# Patient Record
Sex: FEMALE | Race: WHITE | NOT HISPANIC OR LATINO | Employment: STUDENT | URBAN - METROPOLITAN AREA
[De-identification: names, ages, dates, MRNs, and addresses within clinical notes are randomized per-mention and may not be internally consistent; named-entity substitution may affect disease eponyms.]

---
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
## 2024-09-21 ENCOUNTER — PATIENT MESSAGE (OUTPATIENT)
Dept: OBSTETRICS AND GYNECOLOGY | Facility: CLINIC | Age: 19
End: 2024-09-21
Payer: COMMERCIAL

## 2024-09-25 RX ORDER — NORGESTIMATE AND ETHINYL ESTRADIOL 0.25-0.035
1 KIT ORAL DAILY
Qty: 84 TABLET | Refills: 3 | Status: SHIPPED | OUTPATIENT
Start: 2024-09-25 | End: 2025-09-25

## 2024-09-25 NOTE — TELEPHONE ENCOUNTER
Insurance doesn't cover chewable OCPs.  Initially asked about Depo because she has trouble swallowing pills.  I told her OCPs are tiny if she wanted to try a pill first.  She would like to try an OCP as she has never tried to take one.      Allergies and pharmacy UTD

## 2025-03-25 NOTE — PROGRESS NOTES
[facility] Progress Note      Chief Complaint     Annual Exam         Subjective:      History of Present Illness:  Jessie Araujo is a 19 y.o. female new to me here to establish care.    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 Cohn 2022; on OCP since 2024   1st Boyfriend x 7 months via friend of friend; attends [employer]; [place] went to Brother Eliud; 19 y/o   Never sexually active; no experimentation with any drugs or tobacco; drinks white claw, high noon, frozen hot chocolate daquiri at her home and never with her friends or at bar  PHQ2: negative; overall happy at home and appreciates her step-mother and her family      Past Medical History:  Past Medical History:   Diagnosis Date    ADHD (attention deficit hyperactivity disorder)     Scoliosis     Test anxiety 2020       Past Surgical History:  Past Surgical History:   Procedure Laterality Date    TYMPANOSTOMY TUBE PLACEMENT         Allergies:  Review of patient's allergies indicates:  No Known Allergies    Home Medications:  Current Outpatient Medications    Medication Sig Dispense Refill    cetirizine 10 mg chewable tablet Take 10 mg by mouth once daily. 30 tablet 5    fluticasone propionate (FLONASE) 50 mcg/actuation nasal spray 1 spray by Each Nostril route as needed.      loratadine (CLARITIN) 10 mg tablet Take by mouth as needed.      multivitamin capsule Take 1 capsule by mouth once daily.      norgestimate-ethinyl estradioL (ORTHO-CYCLEN) 0.25-35 mg-mcg per tablet Take 1 tablet by mouth once daily. 84 tablet 3     No current facility-administered medications for this visit.        Family History:  Family History   Problem Relation Name Age of Onset    Colon cancer Paternal Grandfather      Other Paternal Grandmother      Daniel's disease Maternal Grandmother      Other Maternal Grandmother          Daniel's chorea    Hypertension Father      Crowder's disease Mother      Scoliosis Paternal Aunt      Hypertension Paternal Aunt      Hypertension Paternal Uncle      Birth defects Neg Hx      Asthma Neg Hx      Diabetes Neg Hx      Breast cancer Neg Hx         Social History:  Social History     Tobacco Use    Smoking status: Never    Smokeless tobacco: Never   Substance Use Topics    Alcohol use: Never    Drug use: Never       Review of Systems:  Pertinent positives and negatives listed in HPI. All other systems are reviewed and are negative.    Health Maintaince :   Health Maintenance Topics with due status: Not Due       Topic Last Completion Date    TETANUS VACCINE 02/23/2016    DTaP/Tdap/Td Vaccines 02/23/2016    RSV Vaccine (Age 60+ and Pregnant patients) Not Due           Eye: UTD wears glasses   Dental: UTD; brushes and flosses bid; invisalign appt today; will get retainer     Immunizations:   Tdap: 2/2016.  Influenza:  flu shot today.  COVID: x  3 - today   Hepatitis C:   Cancer Screening:    The ASCVD Risk score (Marco PUCKETT, et al., 2019) failed to calculate for the following reasons:    The 2019 ASCVD risk score is only valid for ages 40 to 79  "     Objective:   /70 (BP Location: Left arm, Patient Position: Sitting)   Pulse 90   Ht 5' 5" (1.651 m)   Wt 45 kg (99 lb 3.3 oz)   LMP 03/23/2025 (Exact Date)   SpO2 97%   BMI 16.51 kg/m²      Body mass index is 16.51 kg/m².       Physical Examination:  General: Alert and awake in no apparent distress  HEENT: Normocephalic and atraumatic; Tms WNL; mild nasal congestion  Eyes:  PERRL; EOMi with anicteric sclera and clear conjunctivae  Mouth:  Oropharynx clear and without exudate; moist mucous membranes  Neck:   Cervical nodes not enlarged; supple; no bruits  Cardio:  Regular rate and rhythm with normal S1 and S2; no murmurs or rubs  Resp:  CTAB; respirations unlabored; no wheezes, crackles or rhonchi  Abdom: Soft, NTND with normoactive bowel sounds; negative HSM  Extrem: Warm and well-perfused with no clubbing, cyanosis or edema  Skin:  No rashes, lesions, or color changes  Pulses:  2+ and symmetric distally  Neuro:  AAOx3; cooperative and pleasant with no focal deficits  Bimal Stage IV breasts and genitalia    Laboratory:      Most Recent Data:  Lab Results   Component Value Date    WBC 8.95 11/25/2022    HGB 13.0 11/25/2022    HCT 41.9 11/25/2022     11/25/2022    CHOL 154 11/25/2022    TRIG 49 11/25/2022    HDL 50 11/25/2022    ALT 13 11/25/2022    AST 18 11/25/2022     11/25/2022    K 4.0 11/25/2022     11/25/2022    BUN 10 11/25/2022    CO2 23 11/25/2022              CBC:   WBC   Date Value Ref Range Status   11/25/2022 8.95 3.90 - 12.70 K/uL Final     Hemoglobin   Date Value Ref Range Status   11/25/2022 13.0 12.0 - 16.0 g/dL Final     Hematocrit   Date Value Ref Range Status   11/25/2022 41.9 37.0 - 48.5 % Final     Platelets   Date Value Ref Range Status   11/25/2022 230 150 - 450 K/uL Final     MCV   Date Value Ref Range Status   11/25/2022 90 82 - 98 fL Final     RDW   Date Value Ref Range Status   11/25/2022 13.7 11.5 - 14.5 % Final     BMP:   Sodium   Date Value Ref Range " "Status   11/25/2022 136 136 - 145 mmol/L Final     Potassium   Date Value Ref Range Status   11/25/2022 4.0 3.5 - 5.1 mmol/L Final     Chloride   Date Value Ref Range Status   11/25/2022 105 95 - 110 mmol/L Final     CO2   Date Value Ref Range Status   11/25/2022 23 23 - 29 mmol/L Final     BUN   Date Value Ref Range Status   11/25/2022 10 6 - 20 mg/dL Final     Creatinine   Date Value Ref Range Status   11/25/2022 0.8 0.5 - 1.4 mg/dL Final     Glucose   Date Value Ref Range Status   11/25/2022 81 70 - 110 mg/dL Final     Calcium   Date Value Ref Range Status   11/25/2022 9.1 8.7 - 10.5 mg/dL Final     LFTs:   Total Protein   Date Value Ref Range Status   11/25/2022 7.1 6.0 - 8.4 g/dL Final     Albumin   Date Value Ref Range Status   11/25/2022 4.0 3.2 - 4.7 g/dL Final     Total Bilirubin   Date Value Ref Range Status   11/25/2022 0.3 0.1 - 1.0 mg/dL Final     Comment:     For infants and newborns, interpretation of results should be based  on gestational age, weight and in agreement with clinical  observations.    Premature Infant recommended reference ranges:  Up to 24 hours.............<8.0 mg/dL  Up to 48 hours............<12.0 mg/dL  3-5 days..................<15.0 mg/dL  6-29 days.................<15.0 mg/dL       AST   Date Value Ref Range Status   11/25/2022 18 10 - 40 U/L Final     Alkaline Phosphatase   Date Value Ref Range Status   11/25/2022 106 (H) 48 - 95 U/L Final     ALT   Date Value Ref Range Status   11/25/2022 13 10 - 44 U/L Final     Coags: No results found for: "INR", "PROTIME", "PTT"  FLP:      Lab Results   Component Value Date    CHOL 154 11/25/2022    CHOL 158 02/23/2016     Lab Results   Component Value Date    HDL 50 11/25/2022     Lab Results   Component Value Date    LDLCALC 94.2 11/25/2022     Lab Results   Component Value Date    TRIG 49 11/25/2022     Lab Results   Component Value Date    CHOLHDL 32.5 11/25/2022      DM:      LDL Cholesterol   Date Value Ref Range Status   11/25/2022 " "94.2 63.0 - 159.0 mg/dL Final     Comment:     The National Cholesterol Education Program (NCEP) has set the  following guidelines (reference values) for LDL Cholesterol:  Optimal.......................<130 mg/dL  Borderline High...............130-159 mg/dL  High..........................160-189 mg/dL  Very High.....................>190 mg/dL       Creatinine   Date Value Ref Range Status   11/25/2022 0.8 0.5 - 1.4 mg/dL Final     Thyroid: No results found for: "TSH", "FREET4", "C1KACTC", "C1QMMPO", "THYROIDAB"  Anemia: No results found for: "IRON", "TIBC", "FERRITIN", "MXCPYRBL10", "FOLATE"  Cardiac: No results found for: "TROPONINI", "CKTOTAL", "CKMB", "BNP"  Urinalysis:   Color, UA   Date Value Ref Range Status   11/25/2022 Yellow Yellow, Straw, Li Final     Specific Gravity, UA   Date Value Ref Range Status   11/25/2022 1.030 1.005 - 1.030 Final     Nitrite, UA   Date Value Ref Range Status   11/25/2022 Negative Negative Final     Ketones, UA   Date Value Ref Range Status   11/25/2022 Negative Negative Final     Urobilinogen, UA   Date Value Ref Range Status   02/23/2016 normal  Final       Other Laboratory Data:      Other Results:  EKG (my interpretation):     Radiology:  X-Ray Scoliosis Complete  Narrative: EXAMINATION:  XR SCOLIOSIS COMPLETE    CLINICAL HISTORY:  Adolescent idiopathic scoliosis, thoracolumbar region    TECHNIQUE:  AP and lateral scoliosis images    COMPARISON:  July 7, 2021    FINDINGS:  No acute fractures or segmentation abnormalities.  Reconfirmed thoracolumbar scoliosis, convex to the right in the mid thoracic region and to the left in the lower thoracic and lumbar region, position and degrees of curvature to be further commented on by ordering Department.  Reversal of normal cervical lordosis.  Impression: As above.    Electronically signed by: Aj Khan  Date:    07/06/2022  Time:    09:45            Assessment/Plan     Jessie Araujo is a 19 y.o. female with:  1. Healthy " adolescent on routine physical examination  Assessment & Plan:  -start exercise 30 min 5 times per week   -counseling provided regarding protected sex, diet, exercise, drug use  -flu shot today and get COVID booster    Counseling: nutrition (5 servings of fruit/veggies, minimal junk food, drink 6-8 glasses of water daily;  safety (wear helmet while riding bike); avoid smoking, alcohol, drugs; puberty; sleep 8-10 hours per night   peer interaction, sexual education, exercise 1 hour daily; limit computer/cell phone to 2 hours per day during school but can be more lax during summer but must do chores, eat meal with family; read for 1 hour daily    Follow Healthy Lifestyles 9-5-2-1-0 Rule:   Sleep at least 9 hours per night  Eat 5 servings of fruit and veggies per day; limit 2 hours of screen time or less per day; no TV in child's bedroom; 1 or more hours of physical activity per day; 0 sugar sweetened beverages per day  - eat fruit not drink it; avoid sports drinks, sodas.    Need to brush teeth twice per day with fluoride toothpaste and floss - go to dentist twice per year    Wear sunscreen at least 30 spf daily   Schedule eye exam annually  Counseled about importance of abstinence and use of condoms at all times.     Encourage sleep 9-12 hours for 6-13 y/o; 8-10 yrs for 13-18 years         Orders:  -     Lipid Panel; Future; Expected date: 03/26/2025  -     Hemoglobin A1C; Future; Expected date: 03/26/2025  -     Urinalysis; Future; Expected date: 03/26/2025  -     Comprehensive Metabolic Panel; Future; Expected date: 03/26/2025  -     CBC Auto Differential; Future; Expected date: 03/26/2025  -     TSH; Future; Expected date: 03/26/2025    2. Seasonal allergic rhinitis due to pollen  Assessment & Plan:  -simply saline then flonase 1 sq bid  -claritin ODT 10mg or Zyrtec 10mg po qhs       3. Underweight (BMI < 18.5)  Assessment & Plan:  Start MVI daily                  I spent a total of 55 minutes on the day of the  visit.This includes face to face time and non-face to face time preparing to see the patient (eg, review of tests), obtaining and/or reviewing separately obtained history, documenting clinical information in the electronic or other health record, independently interpreting results and communicating results to the patient/family/caregiver, or care coordinator.   Code Status:     Chanel Hoover MD

## 2025-03-26 ENCOUNTER — OFFICE VISIT (OUTPATIENT)
Dept: PRIMARY CARE CLINIC | Facility: CLINIC | Age: 20
End: 2025-03-26
Payer: COMMERCIAL

## 2025-03-26 ENCOUNTER — LAB VISIT (OUTPATIENT)
Dept: LAB | Facility: HOSPITAL | Age: 20
End: 2025-03-26
Attending: INTERNAL MEDICINE
Payer: COMMERCIAL

## 2025-03-26 ENCOUNTER — RESULTS FOLLOW-UP (OUTPATIENT)
Dept: PRIMARY CARE CLINIC | Facility: CLINIC | Age: 20
End: 2025-03-26

## 2025-03-26 VITALS
SYSTOLIC BLOOD PRESSURE: 106 MMHG | BODY MASS INDEX: 16.53 KG/M2 | HEART RATE: 90 BPM | WEIGHT: 99.19 LBS | OXYGEN SATURATION: 97 % | HEIGHT: 65 IN | DIASTOLIC BLOOD PRESSURE: 70 MMHG

## 2025-03-26 DIAGNOSIS — Z00.3 HEALTHY ADOLESCENT ON ROUTINE PHYSICAL EXAMINATION: ICD-10-CM

## 2025-03-26 DIAGNOSIS — J30.1 SEASONAL ALLERGIC RHINITIS DUE TO POLLEN: ICD-10-CM

## 2025-03-26 DIAGNOSIS — Z00.3 HEALTHY ADOLESCENT ON ROUTINE PHYSICAL EXAMINATION: Primary | ICD-10-CM

## 2025-03-26 DIAGNOSIS — R63.6 UNDERWEIGHT (BMI < 18.5): ICD-10-CM

## 2025-03-26 DIAGNOSIS — Z23 NEED FOR VACCINATION: ICD-10-CM

## 2025-03-26 PROBLEM — F41.8 TEST ANXIETY: Status: RESOLVED | Noted: 2020-02-01 | Resolved: 2025-03-26

## 2025-03-26 LAB
ABSOLUTE EOSINOPHIL (OHS): 0.13 K/UL
ABSOLUTE MONOCYTE (OHS): 0.71 K/UL (ref 0.3–1)
ABSOLUTE NEUTROPHIL COUNT (OHS): 2.49 K/UL (ref 1.8–7.7)
ALBUMIN SERPL BCP-MCNC: 3.7 G/DL (ref 3.5–5.2)
ALP SERPL-CCNC: 57 UNIT/L (ref 40–150)
ALT SERPL W/O P-5'-P-CCNC: 24 UNIT/L (ref 10–44)
AMORPH CRY UR QL COMP ASSIST: NORMAL
ANION GAP (OHS): 14 MMOL/L (ref 8–16)
AST SERPL-CCNC: 35 UNIT/L (ref 11–45)
BACTERIA #/AREA URNS AUTO: NORMAL /HPF
BASOPHILS # BLD AUTO: 0.01 K/UL
BASOPHILS NFR BLD AUTO: 0.2 %
BILIRUB SERPL-MCNC: 0.3 MG/DL (ref 0.1–1)
BILIRUB UR QL STRIP.AUTO: NEGATIVE
BUN SERPL-MCNC: 13 MG/DL (ref 6–20)
CALCIUM SERPL-MCNC: 9.2 MG/DL (ref 8.7–10.5)
CHLORIDE SERPL-SCNC: 103 MMOL/L (ref 95–110)
CHOLEST SERPL-MCNC: 151 MG/DL (ref 120–199)
CHOLEST/HDLC SERPL: 4.9 {RATIO} (ref 2–5)
CLARITY UR: ABNORMAL
CO2 SERPL-SCNC: 21 MMOL/L (ref 23–29)
COLOR UR AUTO: ABNORMAL
CREAT SERPL-MCNC: 0.9 MG/DL (ref 0.5–1.4)
EAG (OHS): 100 MG/DL (ref 68–131)
ERYTHROCYTE [DISTWIDTH] IN BLOOD BY AUTOMATED COUNT: 13.1 % (ref 11.5–14.5)
GFR SERPLBLD CREATININE-BSD FMLA CKD-EPI: >60 ML/MIN/1.73/M2
GLUCOSE SERPL-MCNC: 81 MG/DL (ref 70–110)
GLUCOSE UR QL STRIP: NEGATIVE
HBA1C MFR BLD: 5.1 % (ref 4–5.6)
HCT VFR BLD AUTO: 45.4 % (ref 37–48.5)
HDLC SERPL-MCNC: 31 MG/DL (ref 40–75)
HDLC SERPL: 20.5 % (ref 20–50)
HGB BLD-MCNC: 14.1 GM/DL (ref 12–16)
HGB UR QL STRIP: NEGATIVE
HYALINE CASTS UR QL AUTO: 0 /LPF (ref 0–1)
IMM GRANULOCYTES # BLD AUTO: 0.01 K/UL (ref 0–0.04)
IMM GRANULOCYTES NFR BLD AUTO: 0.2 % (ref 0–0.5)
KETONES UR QL STRIP: NEGATIVE
LDLC SERPL CALC-MCNC: 94.2 MG/DL (ref 63–159)
LEUKOCYTE ESTERASE UR QL STRIP: NEGATIVE
LYMPHOCYTES # BLD AUTO: 1.4 K/UL (ref 1–4.8)
MCH RBC QN AUTO: 27.8 PG (ref 27–50)
MCHC RBC AUTO-ENTMCNC: 31.1 G/DL (ref 32–36)
MCV RBC AUTO: 89 FL (ref 82–98)
MICROSCOPIC COMMENT: NORMAL
NITRITE UR QL STRIP: NEGATIVE
NONHDLC SERPL-MCNC: 120 MG/DL
NUCLEATED RBC (/100WBC) (OHS): 0 /100 WBC
PH UR STRIP: 6 [PH]
PLATELET # BLD AUTO: 185 K/UL (ref 150–450)
PMV BLD AUTO: 11.3 FL (ref 9.2–12.9)
POTASSIUM SERPL-SCNC: 3.8 MMOL/L (ref 3.5–5.1)
PROT SERPL-MCNC: 7.5 GM/DL (ref 6–8.4)
PROT UR QL STRIP: ABNORMAL
RBC # BLD AUTO: 5.08 M/UL (ref 4–5.4)
RBC #/AREA URNS AUTO: 0 /HPF (ref 0–4)
RELATIVE EOSINOPHIL (OHS): 2.7 %
RELATIVE LYMPHOCYTE (OHS): 29.5 % (ref 18–48)
RELATIVE MONOCYTE (OHS): 14.9 % (ref 4–15)
RELATIVE NEUTROPHIL (OHS): 52.5 % (ref 38–73)
SODIUM SERPL-SCNC: 138 MMOL/L (ref 136–145)
SP GR UR STRIP: >=1.03
TRIGL SERPL-MCNC: 129 MG/DL (ref 30–150)
TSH SERPL-ACNC: 1.76 UIU/ML (ref 0.4–4)
UROBILINOGEN UR STRIP-ACNC: NEGATIVE EU/DL
WBC # BLD AUTO: 4.75 K/UL (ref 3.9–12.7)
WBC #/AREA URNS AUTO: 0 /HPF (ref 0–5)

## 2025-03-26 PROCEDURE — 84295 ASSAY OF SERUM SODIUM: CPT

## 2025-03-26 PROCEDURE — 85025 COMPLETE CBC W/AUTO DIFF WBC: CPT

## 2025-03-26 PROCEDURE — 81003 URINALYSIS AUTO W/O SCOPE: CPT

## 2025-03-26 PROCEDURE — 84443 ASSAY THYROID STIM HORMONE: CPT

## 2025-03-26 PROCEDURE — 99999 PR PBB SHADOW E&M-EST. PATIENT-LVL IV: CPT | Mod: PBBFAC,,, | Performed by: INTERNAL MEDICINE

## 2025-03-26 PROCEDURE — 82465 ASSAY BLD/SERUM CHOLESTEROL: CPT

## 2025-03-26 PROCEDURE — 83036 HEMOGLOBIN GLYCOSYLATED A1C: CPT

## 2025-03-26 PROCEDURE — 36415 COLL VENOUS BLD VENIPUNCTURE: CPT | Mod: PN

## 2025-03-26 NOTE — PATIENT INSTRUCTIONS
Healthy adolescent on routine physical examination  Assessment & Plan:  -start exercise 30 min 5 times per week   -counseling provided regarding protected sex, diet, exercise, drug use  -flu shot today and get COVID booster    Counseling: nutrition (5 servings of fruit/veggies, minimal junk food, drink 6-8 glasses of water daily;  safety (wear helmet while riding bike); avoid smoking, alcohol, drugs; puberty; sleep 8-10 hours per night   peer interaction, sexual education, exercise 1 hour daily; limit computer/cell phone to 2 hours per day during school but can be more lax during summer but must do chores, eat meal with family; read for 1 hour daily    Follow Healthy Lifestyles 9-5-2-1-0 Rule:   Sleep at least 9 hours per night  Eat 5 servings of fruit and veggies per day; limit 2 hours of screen time or less per day; no TV in child's bedroom; 1 or more hours of physical activity per day; 0 sugar sweetened beverages per day  - eat fruit not drink it; avoid sports drinks, sodas.    Need to brush teeth twice per day with fluoride toothpaste and floss - go to dentist twice per year    Wear sunscreen at least 30 spf daily   Schedule eye exam annually  Counseled about importance of abstinence and use of condoms at all times.     Encourage sleep 9-12 hours for 6-11 y/o; 8-10 yrs for 13-18 years           Seasonal allergic rhinitis due to pollen  Assessment & Plan:  -simply saline then flonase 1 sq bid  -claritin ODT 10mg or Zyrtec 10mg po qhs       Underweight (BMI < 18.5)  Assessment & Plan:  Start MVI daily

## 2025-03-26 NOTE — ASSESSMENT & PLAN NOTE
-simply saline then flonase 1 sq bid  -claritin ODT 10mg or Zyrtec 10mg po qhs   
-start exercise 30 min 5 times per week   -counseling provided regarding protected sex, diet, exercise, drug use  -flu shot today and get COVID booster    Counseling: nutrition (5 servings of fruit/veggies, minimal junk food, drink 6-8 glasses of water daily;  safety (wear helmet while riding bike); avoid smoking, alcohol, drugs; puberty; sleep 8-10 hours per night   peer interaction, sexual education, exercise 1 hour daily; limit computer/cell phone to 2 hours per day during school but can be more lax during summer but must do chores, eat meal with family; read for 1 hour daily    Follow Healthy Lifestyles 9-5-2-1-0 Rule:   Sleep at least 9 hours per night  Eat 5 servings of fruit and veggies per day; limit 2 hours of screen time or less per day; no TV in child's bedroom; 1 or more hours of physical activity per day; 0 sugar sweetened beverages per day  - eat fruit not drink it; avoid sports drinks, sodas.    Need to brush teeth twice per day with fluoride toothpaste and floss - go to dentist twice per year    Wear sunscreen at least 30 spf daily   Schedule eye exam annually  Counseled about importance of abstinence and use of condoms at all times.     Encourage sleep 9-12 hours for 6-11 y/o; 8-10 yrs for 13-18 years       
Start MVI daily   
no

## 2025-09-02 RX ORDER — NORGESTIMATE AND ETHINYL ESTRADIOL 0.25-0.035
1 KIT ORAL DAILY
Qty: 84 TABLET | Refills: 0 | Status: SHIPPED | OUTPATIENT
Start: 2025-09-02